# Patient Record
Sex: FEMALE | Race: BLACK OR AFRICAN AMERICAN | NOT HISPANIC OR LATINO | Employment: FULL TIME | URBAN - METROPOLITAN AREA
[De-identification: names, ages, dates, MRNs, and addresses within clinical notes are randomized per-mention and may not be internally consistent; named-entity substitution may affect disease eponyms.]

---

## 2023-04-27 LAB — GROUP B STREP SCREEN: ABNORMAL

## 2023-09-26 ENCOUNTER — OFFICE VISIT (OUTPATIENT)
Dept: PRIMARY CARE | Facility: CLINIC | Age: 33
End: 2023-09-26
Payer: COMMERCIAL

## 2023-09-26 VITALS
RESPIRATION RATE: 16 BRPM | WEIGHT: 146.3 LBS | BODY MASS INDEX: 25.92 KG/M2 | HEIGHT: 63 IN | HEART RATE: 82 BPM | SYSTOLIC BLOOD PRESSURE: 132 MMHG | OXYGEN SATURATION: 92 % | DIASTOLIC BLOOD PRESSURE: 89 MMHG

## 2023-09-26 DIAGNOSIS — R53.83 OTHER FATIGUE: Primary | ICD-10-CM

## 2023-09-26 PROCEDURE — 99213 OFFICE O/P EST LOW 20 MIN: CPT | Performed by: STUDENT IN AN ORGANIZED HEALTH CARE EDUCATION/TRAINING PROGRAM

## 2023-09-26 ASSESSMENT — ENCOUNTER SYMPTOMS
SPEECH DIFFICULTY: 0
CONSTIPATION: 0
ABDOMINAL PAIN: 0
NAUSEA: 0
ACTIVITY CHANGE: 0
FEVER: 0
HEADACHES: 1
SHORTNESS OF BREATH: 0
HEMATURIA: 0
DYSURIA: 0
COUGH: 0
WHEEZING: 0
WEAKNESS: 0
NUMBNESS: 0
VOMITING: 0
DIZZINESS: 0

## 2023-09-26 NOTE — PROGRESS NOTES
"Subjective   Patient ID: Jessica Howard is a 33 y.o. female who presents for Headache.  Headache   Pertinent negatives include no abdominal pain, coughing, dizziness, fever, nausea, numbness, vomiting or weakness.     Harini reprots headache since a week progressively getting better. Some congestions nad myalgias along with fatigue. Has two children both in day care having congestion as well.   NO fever./ changes in vision reported  Recent ER visit for headache: sent home with conservative management    Past Medical History:   Diagnosis Date    Other specified health status     No pertinent past medical history      Past Surgical History:   Procedure Laterality Date    OTHER SURGICAL HISTORY  09/21/2020    No history of surgery      No family history on file.   Not on File       Occupation:     Review of Systems   Constitutional:  Negative for activity change and fever.   HENT:  Negative for congestion.    Respiratory:  Negative for cough, shortness of breath and wheezing.    Cardiovascular:  Negative for chest pain and leg swelling.   Gastrointestinal:  Negative for abdominal pain, constipation, nausea and vomiting.   Endocrine: Negative for cold intolerance.   Genitourinary:  Negative for dysuria, hematuria and urgency.   Neurological:  Positive for headaches. Negative for dizziness, speech difficulty, weakness and numbness.   Psychiatric/Behavioral:  Negative for self-injury and suicidal ideas.        Objective   Visit Vitals  /89   Pulse 82   Resp 16   Ht 1.6 m (5' 3\")   Wt 66.4 kg (146 lb 4.8 oz)   SpO2 92%   BMI 25.92 kg/m²   BSA 1.72 m²      Physical Exam  Constitutional:       Appearance: Normal appearance.   HENT:      Head: Normocephalic and atraumatic.      Nose: Nose normal.      Mouth/Throat:      Mouth: Mucous membranes are moist.   Eyes:      Conjunctiva/sclera: Conjunctivae normal.      Pupils: Pupils are equal, round, and reactive to light.   Cardiovascular:      Rate and Rhythm: Normal rate and " regular rhythm.      Pulses: Normal pulses.      Heart sounds: Normal heart sounds.   Pulmonary:      Effort: Pulmonary effort is normal.      Breath sounds: Normal breath sounds.   Musculoskeletal:         General: Normal range of motion.      Cervical back: Neck supple.   Skin:     General: Skin is warm.   Neurological:      General: No focal deficit present.      Mental Status: She is alert and oriented to person, place, and time. Mental status is at baseline.      Cranial Nerves: No cranial nerve deficit.      Sensory: No sensory deficit.      Motor: No weakness.      Coordination: Coordination normal.      Gait: Gait normal.   Psychiatric:         Mood and Affect: Mood normal.         Behavior: Behavior normal.         Thought Content: Thought content normal.         Judgment: Judgment normal.         Assessment/Plan     Problem List Items Addressed This Visit    None  Visit Diagnoses       Other fatigue    -  Primary         Possibly post viral fatigue given the congestion and contact with  children/ Improving progressively. Will adv patient to hydrate well, blood work in ER neg for anemia; adv patient to monitor clinical and call with worsening of symptoms   Headache on and off ; at diff site, mild to moderate in intensity, non radiating, No changes in vision/ nause/ vomiting/ FND reproted; therefore no indication for imaging at this time   Patient to seek medical attention immediately / call 911  if has worsening of symptoms  or develops alarm symptoms as discussed. Verbalizes understanding

## 2023-10-13 PROBLEM — R51.9 HEADACHE: Status: ACTIVE | Noted: 2023-10-13

## 2023-10-13 PROBLEM — E55.9 VITAMIN D DEFICIENCY: Status: ACTIVE | Noted: 2023-10-13

## 2023-10-13 PROBLEM — N92.6 MISSED PERIOD: Status: ACTIVE | Noted: 2023-10-13

## 2023-10-13 PROBLEM — E66.3 OVERWEIGHT (BMI 25.0-29.9): Status: ACTIVE | Noted: 2023-10-13

## 2023-10-13 PROBLEM — N89.8 VAGINAL DISCHARGE: Status: ACTIVE | Noted: 2023-10-13

## 2023-10-13 PROBLEM — B00.9 HSV INFECTION: Status: ACTIVE | Noted: 2023-10-13

## 2023-10-13 PROBLEM — R10.2 PELVIC PAIN: Status: ACTIVE | Noted: 2023-10-13

## 2023-10-13 PROBLEM — T78.40XA ALLERGIC REACTION: Status: ACTIVE | Noted: 2023-10-13

## 2023-10-13 PROBLEM — N92.6 IRREGULAR MENSTRUAL CYCLE: Status: ACTIVE | Noted: 2023-10-13

## 2023-10-13 PROBLEM — E87.6 HYPOKALEMIA: Status: ACTIVE | Noted: 2023-10-13

## 2023-10-13 PROBLEM — R10.84 GENERALIZED ABDOMINAL PAIN: Status: ACTIVE | Noted: 2023-10-13

## 2023-10-13 PROBLEM — R09.82 POST-NASAL DRIP: Status: ACTIVE | Noted: 2023-10-13

## 2023-10-13 PROBLEM — K59.09 CONSTIPATION, CHRONIC: Status: ACTIVE | Noted: 2023-10-13

## 2023-10-13 PROBLEM — G43.909 MIGRAINE HEADACHE: Status: ACTIVE | Noted: 2023-10-13

## 2023-10-13 PROBLEM — N91.1 SECONDARY AMENORRHEA: Status: ACTIVE | Noted: 2023-10-13

## 2023-10-13 RX ORDER — PNV NO.95/FERROUS FUM/FOLIC AC 28MG-0.8MG
TABLET ORAL
COMMUNITY
Start: 2023-01-16 | End: 2023-10-16

## 2023-10-13 RX ORDER — IBUPROFEN 800 MG/1
800 TABLET ORAL EVERY 8 HOURS PRN
COMMUNITY
Start: 2021-05-25 | End: 2023-10-16

## 2023-10-13 RX ORDER — ACETAMINOPHEN 500 MG
1000 TABLET ORAL 3 TIMES DAILY
COMMUNITY
Start: 2021-05-25 | End: 2023-10-16

## 2023-10-13 RX ORDER — CYCLOBENZAPRINE HCL 5 MG
5 TABLET ORAL 3 TIMES DAILY
COMMUNITY
Start: 2021-10-12 | End: 2023-10-16

## 2023-10-13 RX ORDER — DOCUSATE SODIUM 100 MG/1
100 CAPSULE, LIQUID FILLED ORAL 2 TIMES DAILY PRN
COMMUNITY
Start: 2021-05-25 | End: 2023-10-16

## 2023-10-13 RX ORDER — ONDANSETRON 4 MG/1
4 TABLET, ORALLY DISINTEGRATING ORAL EVERY 6 HOURS PRN
COMMUNITY
Start: 2021-11-09 | End: 2023-10-16

## 2023-10-13 RX ORDER — FAMOTIDINE 10 MG/1
1 TABLET ORAL 2 TIMES DAILY
COMMUNITY
Start: 2022-03-22 | End: 2023-10-16

## 2023-10-13 RX ORDER — ERGOCALCIFEROL (VITAMIN D2) 10 MCG
2 TABLET ORAL DAILY
COMMUNITY
Start: 2022-01-06 | End: 2023-10-16

## 2023-10-13 RX ORDER — ACETAMINOPHEN 325 MG/1
3 TABLET ORAL EVERY 6 HOURS PRN
COMMUNITY
Start: 2023-05-12 | End: 2023-10-16

## 2023-10-13 RX ORDER — IBUPROFEN 600 MG/1
600 TABLET ORAL EVERY 6 HOURS PRN
COMMUNITY
Start: 2023-05-12 | End: 2023-10-16

## 2023-10-13 RX ORDER — FLUTICASONE PROPIONATE 50 MCG
1 SPRAY, SUSPENSION (ML) NASAL 2 TIMES DAILY PRN
COMMUNITY
Start: 2022-07-14 | End: 2023-10-16

## 2023-10-16 ENCOUNTER — PROCEDURE VISIT (OUTPATIENT)
Dept: OBSTETRICS AND GYNECOLOGY | Facility: CLINIC | Age: 33
End: 2023-10-16
Payer: COMMERCIAL

## 2023-10-16 VITALS
SYSTOLIC BLOOD PRESSURE: 100 MMHG | WEIGHT: 148 LBS | HEIGHT: 64 IN | BODY MASS INDEX: 25.27 KG/M2 | DIASTOLIC BLOOD PRESSURE: 72 MMHG

## 2023-10-16 DIAGNOSIS — N92.1 BREAKTHROUGH BLEEDING ON NEXPLANON: Primary | ICD-10-CM

## 2023-10-16 DIAGNOSIS — Z97.5 BREAKTHROUGH BLEEDING ON NEXPLANON: Primary | ICD-10-CM

## 2023-10-16 PROCEDURE — 99213 OFFICE O/P EST LOW 20 MIN: CPT | Performed by: OBSTETRICS & GYNECOLOGY

## 2023-10-16 RX ORDER — ETONOGESTREL 68 MG/1
1 IMPLANT SUBCUTANEOUS ONCE
COMMUNITY

## 2023-10-16 ASSESSMENT — PAIN SCALES - GENERAL: PAINLEVEL: 0-NO PAIN

## 2023-10-16 ASSESSMENT — ENCOUNTER SYMPTOMS
CHILLS: 0
FLANK PAIN: 0
ABDOMINAL DISTENTION: 0
VOMITING: 0
ABDOMINAL PAIN: 0
HEADACHES: 1
SHORTNESS OF BREATH: 0
BLOOD IN STOOL: 0
APPETITE CHANGE: 0
BACK PAIN: 0
NAUSEA: 1
DYSURIA: 0
HEMATURIA: 0
FEVER: 0
COLOR CHANGE: 0
UNEXPECTED WEIGHT CHANGE: 0
CONSTIPATION: 0
DIARRHEA: 0
FATIGUE: 0
FREQUENCY: 0
SLEEP DISTURBANCE: 0

## 2023-10-16 NOTE — PROGRESS NOTES
"Jessica Howard is a 33 y.o. . Here to discuss nexplanon.   Pt had nexplanon placed while in the hospital after the birth of her last baby.   Baby born 23    Pt reports almost 5 months of vaginal bleeding with nexplanon.   Pt became concerned when she had 1 week of headaches and nasuea. She went to the ED and saw her PCP.  Headaches spontaneously resolved. Pt worried headaches d/t nexplanon.   Pt reports bleeding stopped 1 week ago.    Likes ease of nexplanon.       OB History    No obstetric history on file.       Past med hx and past surg hx reviewed and notable for: none    Objective   /72   Ht 1.626 m (5' 4\")   Wt 67.1 kg (148 lb)   BMI 25.40 kg/m²     Review of Systems   Constitutional:  Negative for appetite change, chills, fatigue, fever and unexpected weight change.   Respiratory:  Negative for shortness of breath.    Cardiovascular:  Negative for chest pain.   Gastrointestinal:  Positive for nausea. Negative for abdominal distention, abdominal pain, blood in stool, constipation, diarrhea and vomiting.   Endocrine: Negative for cold intolerance and heat intolerance.   Genitourinary:  Positive for vaginal bleeding. Negative for dyspareunia, dysuria, flank pain, frequency, genital sores, hematuria, menstrual problem, pelvic pain, urgency, vaginal discharge and vaginal pain.   Musculoskeletal:  Negative for back pain.   Skin:  Negative for color change.   Neurological:  Positive for headaches.   Psychiatric/Behavioral:  Negative for sleep disturbance.        Physical Exam  Constitutional:       Appearance: Normal appearance. She is normal weight.   Musculoskeletal:         General: Normal range of motion.      Comments: Nexplanon easily palpated in LUE ; proper position confirmed   Neurological:      General: No focal deficit present.      Mental Status: She is alert and oriented to person, place, and time.   Psychiatric:         Mood and Affect: Mood normal.         Behavior: Behavior normal.  "        Thought Content: Thought content normal.         Judgment: Judgment normal.          Assessment and Plan:    Contraception / BTB on nexplanon  Pt would like to keep nexplanon in place ; wanted reassurance regarding position.    Position normal on exam today.   We discussed that headaches and nausea 4 months after the nexplanon was place were likely not related.   Headaches and nausea have now resolved.   Irregular bleeding has resolved.     We discussed if irregular bleeding resumes I can prescribe short course of oral estrogen to help. Pt agreeable and will call/message if needed.     No orders of the defined types were placed in this encounter.

## 2024-02-29 ENCOUNTER — HOSPITAL ENCOUNTER (EMERGENCY)
Facility: HOSPITAL | Age: 34
Discharge: HOME | End: 2024-02-29
Attending: EMERGENCY MEDICINE
Payer: COMMERCIAL

## 2024-02-29 VITALS
BODY MASS INDEX: 23.9 KG/M2 | DIASTOLIC BLOOD PRESSURE: 70 MMHG | SYSTOLIC BLOOD PRESSURE: 120 MMHG | WEIGHT: 140 LBS | HEIGHT: 64 IN | OXYGEN SATURATION: 100 % | HEART RATE: 92 BPM | TEMPERATURE: 97.9 F | RESPIRATION RATE: 16 BRPM

## 2024-02-29 DIAGNOSIS — N93.9 VAGINAL BLEEDING: Primary | ICD-10-CM

## 2024-02-29 LAB
ALBUMIN SERPL BCP-MCNC: 5 G/DL (ref 3.4–5)
ALP SERPL-CCNC: 77 U/L (ref 33–110)
ALT SERPL W P-5'-P-CCNC: 14 U/L (ref 7–45)
ANION GAP SERPL CALC-SCNC: 15 MMOL/L (ref 10–20)
APPEARANCE UR: CLEAR
AST SERPL W P-5'-P-CCNC: 18 U/L (ref 9–39)
B-HCG SERPL-ACNC: <2 MIU/ML
BASOPHILS # BLD AUTO: 0.02 X10*3/UL (ref 0–0.1)
BASOPHILS NFR BLD AUTO: 0.3 %
BILIRUB DIRECT SERPL-MCNC: 0.3 MG/DL (ref 0–0.3)
BILIRUB SERPL-MCNC: 2 MG/DL (ref 0–1.2)
BILIRUB UR STRIP.AUTO-MCNC: NEGATIVE MG/DL
BUN SERPL-MCNC: 12 MG/DL (ref 6–23)
CALCIUM SERPL-MCNC: 9.5 MG/DL (ref 8.6–10.3)
CHLORIDE SERPL-SCNC: 101 MMOL/L (ref 98–107)
CO2 SERPL-SCNC: 25 MMOL/L (ref 21–32)
COLOR UR: YELLOW
CREAT SERPL-MCNC: 0.79 MG/DL (ref 0.5–1.05)
EGFRCR SERPLBLD CKD-EPI 2021: >90 ML/MIN/1.73M*2
EOSINOPHIL # BLD AUTO: 0.04 X10*3/UL (ref 0–0.7)
EOSINOPHIL NFR BLD AUTO: 0.6 %
ERYTHROCYTE [DISTWIDTH] IN BLOOD BY AUTOMATED COUNT: 13.6 % (ref 11.5–14.5)
GLUCOSE SERPL-MCNC: 90 MG/DL (ref 74–99)
GLUCOSE UR STRIP.AUTO-MCNC: NEGATIVE MG/DL
HCG UR QL IA.RAPID: NEGATIVE
HCT VFR BLD AUTO: 41 % (ref 36–46)
HGB BLD-MCNC: 13.4 G/DL (ref 12–16)
IMM GRANULOCYTES # BLD AUTO: 0.02 X10*3/UL (ref 0–0.7)
IMM GRANULOCYTES NFR BLD AUTO: 0.3 % (ref 0–0.9)
KETONES UR STRIP.AUTO-MCNC: NEGATIVE MG/DL
LEUKOCYTE ESTERASE UR QL STRIP.AUTO: NEGATIVE
LYMPHOCYTES # BLD AUTO: 3.3 X10*3/UL (ref 1.2–4.8)
LYMPHOCYTES NFR BLD AUTO: 45.7 %
MCH RBC QN AUTO: 29.1 PG (ref 26–34)
MCHC RBC AUTO-ENTMCNC: 32.7 G/DL (ref 32–36)
MCV RBC AUTO: 89 FL (ref 80–100)
MONOCYTES # BLD AUTO: 0.52 X10*3/UL (ref 0.1–1)
MONOCYTES NFR BLD AUTO: 7.2 %
NEUTROPHILS # BLD AUTO: 3.32 X10*3/UL (ref 1.2–7.7)
NEUTROPHILS NFR BLD AUTO: 45.9 %
NITRITE UR QL STRIP.AUTO: NEGATIVE
NRBC BLD-RTO: 0 /100 WBCS (ref 0–0)
PH UR STRIP.AUTO: 6 [PH]
PLATELET # BLD AUTO: 276 X10*3/UL (ref 150–450)
POTASSIUM SERPL-SCNC: 3.7 MMOL/L (ref 3.5–5.3)
PROT SERPL-MCNC: 8.2 G/DL (ref 6.4–8.2)
PROT UR STRIP.AUTO-MCNC: NEGATIVE MG/DL
RBC # BLD AUTO: 4.6 X10*6/UL (ref 4–5.2)
RBC # UR STRIP.AUTO: NEGATIVE /UL
SODIUM SERPL-SCNC: 137 MMOL/L (ref 136–145)
SP GR UR STRIP.AUTO: 1.01
UROBILINOGEN UR STRIP.AUTO-MCNC: <2 MG/DL
WBC # BLD AUTO: 7.2 X10*3/UL (ref 4.4–11.3)

## 2024-02-29 PROCEDURE — 36415 COLL VENOUS BLD VENIPUNCTURE: CPT | Performed by: EMERGENCY MEDICINE

## 2024-02-29 PROCEDURE — 81025 URINE PREGNANCY TEST: CPT | Performed by: EMERGENCY MEDICINE

## 2024-02-29 PROCEDURE — 82248 BILIRUBIN DIRECT: CPT | Performed by: EMERGENCY MEDICINE

## 2024-02-29 PROCEDURE — 84702 CHORIONIC GONADOTROPIN TEST: CPT | Performed by: EMERGENCY MEDICINE

## 2024-02-29 PROCEDURE — 80048 BASIC METABOLIC PNL TOTAL CA: CPT | Performed by: EMERGENCY MEDICINE

## 2024-02-29 PROCEDURE — 99283 EMERGENCY DEPT VISIT LOW MDM: CPT

## 2024-02-29 PROCEDURE — 85025 COMPLETE CBC W/AUTO DIFF WBC: CPT | Performed by: EMERGENCY MEDICINE

## 2024-02-29 PROCEDURE — 81003 URINALYSIS AUTO W/O SCOPE: CPT | Performed by: EMERGENCY MEDICINE

## 2024-02-29 ASSESSMENT — COLUMBIA-SUICIDE SEVERITY RATING SCALE - C-SSRS
2. HAVE YOU ACTUALLY HAD ANY THOUGHTS OF KILLING YOURSELF?: NO
6. HAVE YOU EVER DONE ANYTHING, STARTED TO DO ANYTHING, OR PREPARED TO DO ANYTHING TO END YOUR LIFE?: NO
1. IN THE PAST MONTH, HAVE YOU WISHED YOU WERE DEAD OR WISHED YOU COULD GO TO SLEEP AND NOT WAKE UP?: NO

## 2024-02-29 ASSESSMENT — PAIN SCALES - GENERAL: PAINLEVEL_OUTOF10: 8

## 2024-02-29 ASSESSMENT — PAIN - FUNCTIONAL ASSESSMENT: PAIN_FUNCTIONAL_ASSESSMENT: 0-10

## 2024-02-29 ASSESSMENT — PAIN DESCRIPTION - LOCATION: LOCATION: PELVIS

## 2024-02-29 NOTE — ED TRIAGE NOTES
"PT STATES \"I THINK I AM HAVING AN ECTOPIC PREGNANCY, I HAVE BIRTH CONTROL IN MY ARM BUT MY DAUGHTER JUMPED ON MY STOMACH LAST WEEK AND IT FELT LIKE DEATH\" DENIES DYSURIA, PT STATES \"I HAVE BEEN BLEEDING FOR A MONTH OFF AND ON EVER SINCE I GOT THIS BIRTH CONTROL, I DELIVERED MY SON 9 MONTHS AGO AND I HAVE NOT BEEN RIGHT SINCE\"   "

## 2024-03-03 NOTE — ED PROVIDER NOTES
HPI   Chief Complaint   Patient presents with    Abdominal Pain       HPI: []  33-year-old  female who has no medical history comes in with concerns for ectopic pregnancy.  She states that she got both of her implant about a month ago and since then she has had intermittent spotting.  And some abdominal cramping.  She was concerned she may be pregnant and has an ectopic pregnancy.  Bleeding is minimal and only spotting here and there.  Pain is minimal cramp-like in nature.  She has no associated nausea vomit diarrhea fever chills cough congestion incontinence seizures syncope or near syncope.  Denies hematemesis melena medic easier no hemoptysis.  She has irregular menstrual period.    Past history: None  Social: Patient denies current tobacco alcohol drug abuse.  REVIEW OF SYSTEMS:    GENERAL.: No weight loss, fatigue, anorexia, insomnia, fever.    EYES: No vision loss, double vision, drainage, eye pain.    ENT: No pharyngitis, dry mouth.    CARDIOPULMONARY: No chest pain, palpitations, syncope, near syncope. No shortness of breath, cough, hemoptysis.    GI: No abdominal pain, change in bowel habits, melena, hematemesis, hematochezia, nausea, vomiting, diarrhea.    : No discharge, dysuria, frequency, urgency, hematuria.  Positive vaginal spotting    MS: No limb pain, joint pain, joint swelling.    SKIN: No rashes.    PSYCH: No depression, anxiety, suicidality, homicidality.    Review of systems is otherwise negative unless stated above or in history of present illness.  Social history, family history, allergies reviewed.  PHYSICAL EXAM:    GENERAL: Vitals noted, no distress. Alert and oriented  x 3. Non-toxic.  Appears anxious    EENT: TMs clear. Posterior oropharynx unremarkable. No meningismus. No LAD.     NECK: Supple. Nontender. No midline tenderness.     CARDIAC: Regular, rate, rhythm. No murmurs rubs or gallops. No JVD    PULMONARY: Lungs clear bilaterally with good aeration. No wheezes rales or  rhonchi. No respiratory distress.     ABDOMEN: Soft, nonsurgical. Nontender. No peritoneal signs. Normoactive bowel sounds. No pulsatile masses.  Negative CVA tenderness    EXTREMITIES: No peripheral edema. Negative Homans bilaterally, no cords.  2+ bounding pulses well-perfused    SKIN: No rash. Intact.     NEURO: No focal neurologic deficits, NIH score of 0. Cranial nerves normal as tested from II through XII.     MEDICAL DECISION MAKING:  Pregnancy negative, CBC with differential is unremarkable no leukocytosis stable hemoglobin chemistries are normal serum hCG negative, UA negative.    Treatment in ED: IV established placed on a cardiac monitor.    ED course: Patient remained stable hemodynamic.  With no active vaginal bleeding.  She was relieved when she found she is not pregnant.    Impression: #1 dysfunctional uterine bleeding    Plan/MDM: Young female 33 years old comes in with vaginal spotting for months duration after he had a birth control implant in her arm she was concern for ectopic pregnancy her pregnancy is negative.  The urine and serum hCG, her labs are reassuring no leukocytosis stable hemoglobin UA negative low concern for ectopic pregnancy or hemorrhagic shock, patient reassured be discharged home supportive care recommended close outpatient follow-up primary doctor and gynecologist with strict return precaution.                          No data recorded                   Patient History   Past Medical History:   Diagnosis Date    Other specified health status     No pertinent past medical history     Past Surgical History:   Procedure Laterality Date    OTHER SURGICAL HISTORY  09/21/2020    No history of surgery     No family history on file.  Social History     Tobacco Use    Smoking status: Not on file    Smokeless tobacco: Not on file   Substance Use Topics    Alcohol use: Not on file    Drug use: Not on file       Physical Exam   ED Triage Vitals   Temperature Heart Rate Respirations BP    02/29/24 1138 02/29/24 1138 02/29/24 1138 02/29/24 1138   36.6 °C (97.8 °F) (!) 109 18 (!) 155/93      Pulse Ox Temp Source Heart Rate Source Patient Position   02/29/24 1138 02/29/24 1410 02/29/24 1410 --   100 % Oral Monitor       BP Location FiO2 (%)     -- --             Physical Exam    ED Course & MDM   ED Course as of 03/03/24 1614   Thu Feb 29, 2024   1402 Patient UA negative for UTI pregnancy negative in the urine and blood CBC blood chemistries unremarkable patient reassured, she was concerned for possible pregnancy advised and outpatient follow-up with primary doctor and gynecologist regarding her vaginal bleeding with close outpatient follow-up and strict return precaution. [MT]      ED Course User Index  [MT] Osvaldo Lopez MD         Diagnoses as of 03/03/24 1614   Vaginal bleeding       Medical Decision Making      Procedure  Procedures     Osvaldo Lopez MD  03/03/24 1622

## 2024-03-11 ENCOUNTER — APPOINTMENT (OUTPATIENT)
Dept: PRIMARY CARE | Facility: CLINIC | Age: 34
End: 2024-03-11
Payer: COMMERCIAL

## 2025-03-21 ENCOUNTER — OFFICE VISIT (OUTPATIENT)
Dept: URGENT CARE | Age: 35
End: 2025-03-21
Payer: COMMERCIAL

## 2025-03-21 VITALS
HEART RATE: 93 BPM | TEMPERATURE: 97.9 F | RESPIRATION RATE: 17 BRPM | OXYGEN SATURATION: 99 % | SYSTOLIC BLOOD PRESSURE: 143 MMHG | DIASTOLIC BLOOD PRESSURE: 89 MMHG

## 2025-03-21 DIAGNOSIS — R03.0 ELEVATED BLOOD PRESSURE READING: ICD-10-CM

## 2025-03-21 DIAGNOSIS — R42 DIZZINESS: Primary | ICD-10-CM

## 2025-03-21 LAB
POC APPEARANCE, URINE: CLEAR
POC BILIRUBIN, URINE: NEGATIVE
POC BLOOD, URINE: ABNORMAL
POC COLOR, URINE: YELLOW
POC FINGERSTICK BLOOD GLUCOSE: 94 MG/DL (ref 70–100)
POC GLUCOSE, URINE: NEGATIVE MG/DL
POC KETONES, URINE: ABNORMAL MG/DL
POC LEUKOCYTES, URINE: NEGATIVE
POC NITRITE,URINE: NEGATIVE
POC PH, URINE: 6 PH
POC PROTEIN, URINE: NEGATIVE MG/DL
POC SPECIFIC GRAVITY, URINE: <=1.005
POC UROBILINOGEN, URINE: 0.2 EU/DL
PREGNANCY TEST URINE, POC: NEGATIVE

## 2025-03-21 RX ORDER — MECLIZINE HCL 12.5 MG 12.5 MG/1
12.5 TABLET ORAL 3 TIMES DAILY PRN
Qty: 15 TABLET | Refills: 0 | Status: SHIPPED | OUTPATIENT
Start: 2025-03-21 | End: 2025-03-26

## 2025-03-21 RX ORDER — MECLIZINE HCL 12.5 MG 12.5 MG/1
12.5 TABLET ORAL ONCE
Status: COMPLETED | OUTPATIENT
Start: 2025-03-21 | End: 2025-03-21

## 2025-03-21 RX ORDER — ONDANSETRON 4 MG/1
4 TABLET, FILM COATED ORAL EVERY 8 HOURS PRN
Qty: 10 TABLET | Refills: 0 | Status: SHIPPED | OUTPATIENT
Start: 2025-03-21 | End: 2025-03-28

## 2025-03-21 RX ADMIN — MECLIZINE HCL 12.5 MG 12.5 MG: 12.5 TABLET ORAL at 15:20

## 2025-03-21 ASSESSMENT — PATIENT HEALTH QUESTIONNAIRE - PHQ9
2. FEELING DOWN, DEPRESSED OR HOPELESS: NOT AT ALL
1. LITTLE INTEREST OR PLEASURE IN DOING THINGS: NOT AT ALL
SUM OF ALL RESPONSES TO PHQ9 QUESTIONS 1 AND 2: 0

## 2025-03-21 NOTE — PROGRESS NOTES
HPI:  Patient states that she started having dizziness earlier today.  Pt states that she has a sensation of sea sickness and spinning.  Pt has a cold about 2 weeks ago.  Pt noticed some pressure in her left ear.  Pt denies fever/chills.  No cp or sob.  No numbness/weakness in ue/le.  No HA. No blurry vision.  Pt had some nausea.  Pt is currently spotting.  No hx/o vertigo in the past.      ROS:  No fever/chills  No n/v  No cp  No sob  No ha  No blurry vision  No numbness/weakness    PE:    A&O x3  NCAT  PERRLA, EOMI  TM clear bl  No pharyngeal erythema  Sinus tachy, no jvd, no carotid brui  CTAB  CN 2-12 intact as tested  MOEx4  No focal deficit  Judgement normal  No submandibular nodes    Results:  UA:+blood  Urine pregnancy:negative  EKG:sinus rhythm, no acute changes  Blood sugar: 94    After Meclizine pt feels a little better    A/P:   Dizziness  Elevated blood pressure reading    Your symptoms are likely secondary to vertigo.  Increase fluids.  Rest.  Use meclizine as needed.  If no improvement in 5 days follow up with your doctor for further evaluation.  Low salt diet.  Monitor blood pressure at home and recheck with your doctor if remains elevated. Go to the ER if symptoms start getting worse.

## 2025-05-30 ENCOUNTER — APPOINTMENT (OUTPATIENT)
Dept: PRIMARY CARE | Facility: CLINIC | Age: 35
End: 2025-05-30
Payer: COMMERCIAL

## 2025-05-30 VITALS
SYSTOLIC BLOOD PRESSURE: 100 MMHG | DIASTOLIC BLOOD PRESSURE: 66 MMHG | OXYGEN SATURATION: 98 % | HEART RATE: 76 BPM | WEIGHT: 148 LBS | HEIGHT: 64 IN | BODY MASS INDEX: 25.27 KG/M2

## 2025-05-30 DIAGNOSIS — D64.9 ANEMIA, UNSPECIFIED TYPE: Primary | ICD-10-CM

## 2025-05-30 DIAGNOSIS — Z11.3 SCREEN FOR STD (SEXUALLY TRANSMITTED DISEASE): ICD-10-CM

## 2025-05-30 DIAGNOSIS — Z00.00 HEALTH CARE MAINTENANCE: ICD-10-CM

## 2025-05-30 PROCEDURE — 99395 PREV VISIT EST AGE 18-39: CPT | Performed by: STUDENT IN AN ORGANIZED HEALTH CARE EDUCATION/TRAINING PROGRAM

## 2025-05-30 PROCEDURE — 1036F TOBACCO NON-USER: CPT | Performed by: STUDENT IN AN ORGANIZED HEALTH CARE EDUCATION/TRAINING PROGRAM

## 2025-05-30 PROCEDURE — 3008F BODY MASS INDEX DOCD: CPT | Performed by: STUDENT IN AN ORGANIZED HEALTH CARE EDUCATION/TRAINING PROGRAM

## 2025-05-30 ASSESSMENT — ENCOUNTER SYMPTOMS
NAUSEA: 0
DIZZINESS: 0
ACTIVITY CHANGE: 0
CONSTIPATION: 0
SHORTNESS OF BREATH: 0
SPEECH DIFFICULTY: 0
WHEEZING: 0
NUMBNESS: 0
DYSURIA: 0
COUGH: 0
ABDOMINAL PAIN: 0
FEVER: 0
VOMITING: 0
WEAKNESS: 0
HEMATURIA: 0

## 2025-05-30 NOTE — PROGRESS NOTES
Subjective   Patient ID: Jessica Howrad is a 35 y.o. female who presents for Annual Exam.  HPI  Patient is here for annual physical         # reports diarrhoea since morning  No abd pain   No fever/ blood in stools or mucus  No travel history  No Respiratory/ congestion     []adv to hydrate and continue to monitor   Call back in 3-4 days if not feeling better   To seek medical attention with worsening of symptoms / development of new symptoms as mentioned above    #  vit D def   # HCM  PAP- 2022 next due 2025; adv to call GYN  Has a nexplanon       Vaccines - declines         Medical History[1]   Surgical History[2]   Family History[3]   Allergies[4]       Occupation:     Review of Systems   Constitutional:  Negative for activity change and fever.   HENT:  Negative for congestion.    Respiratory:  Negative for cough, shortness of breath and wheezing.    Cardiovascular:  Negative for chest pain and leg swelling.   Gastrointestinal:  Negative for abdominal pain, constipation, nausea and vomiting.   Endocrine: Negative for cold intolerance.   Genitourinary:  Negative for dysuria, hematuria and urgency.   Neurological:  Negative for dizziness, speech difficulty, weakness and numbness.   Psychiatric/Behavioral:  Negative for self-injury and suicidal ideas.        Objective   Visit Vitals  Smoking Status Never      Physical Exam  HENT:      Head: Normocephalic and atraumatic.      Right Ear: Tympanic membrane normal.      Left Ear: Tympanic membrane normal.      Nose: Nose normal.      Mouth/Throat:      Mouth: Mucous membranes are moist.   Eyes:      Extraocular Movements: Extraocular movements intact.      Conjunctiva/sclera: Conjunctivae normal.      Pupils: Pupils are equal, round, and reactive to light.   Cardiovascular:      Rate and Rhythm: Normal rate and regular rhythm.      Pulses: Normal pulses.      Heart sounds: Normal heart sounds.   Pulmonary:      Effort: Pulmonary effort is normal.      Breath sounds:  Normal breath sounds. No stridor. No rhonchi.   Abdominal:      General: Bowel sounds are normal.      Palpations: Abdomen is soft.      Tenderness: There is no abdominal tenderness. There is no guarding or rebound.   Musculoskeletal:      Cervical back: Neck supple.   Neurological:      Mental Status: She is oriented to person, place, and time.   Psychiatric:         Mood and Affect: Mood normal.         Behavior: Behavior normal.         Assessment/Plan   Diagnoses and all orders for this visit:  Anemia, unspecified type  -     Ferritin; Future  -     Iron and TIBC; Future  Health care maintenance  -     Hemoglobin A1C; Future  -     CBC  -     Lipid Panel; Future  -     Comprehensive Metabolic Panel; Future  -     TSH with reflex to Free T4 if abnormal; Future  Screen for STD (sexually transmitted disease)  -     Hepatitis C Antibody; Future  -     Hepatitis B Surface Antigen; Future  -     Hepatitis B Surface Antibody; Future  -     HIV 1/2 Antigen/Antibody Screen with Reflex to Confirmation; Future  -     Trichomonas vaginalis, Amplified; Future  -     C. trachomatis / N. gonorrhoeae, Amplified, Urogenital; Future              [1]   Past Medical History:  Diagnosis Date    Other specified health status     No pertinent past medical history   [2]   Past Surgical History:  Procedure Laterality Date    OTHER SURGICAL HISTORY  09/21/2020    No history of surgery   [3] No family history on file.  [4] No Known Allergies

## 2025-05-30 NOTE — PATIENT INSTRUCTIONS
It was nice seeing you today   Here is a a summary of what we discussed -  Your Blood pressure is at goal today. . Please take a low salt diet and exercise atleast for 150min/week  We have ordered some labs for you. Please proceed to the our lab at [address] to give these labs. Please ensure these labs are given in a fasting state.   3. Please call GYN for PAP   4. Vaccinations recommended for you are    Annual flu vaccine  Covid vaccine as per CDC guidelines  HPV vaccine  Hep B vaccine  Tetanus vaccine (once every 10 years)      Please call the scheduling line below to set up the appointment    Once all the results are obtained we will call you with abnormal results if any and discuss necessity medication/lifestyle changes/further evaluation.  Please feel free to call our office at [phone] with any questions  Please do not hesitate to call us for medication refills.  In case of emergency please proceed to the nearest emergency room or call 911.    Please follow-up with me in 3 months.  You can set your appointment by stopping by at the  or calling our office at [phone] or logging onto [employer] account.

## 2025-06-03 LAB
ALBUMIN SERPL-MCNC: 4.7 G/DL (ref 3.6–5.1)
ALP SERPL-CCNC: 71 U/L (ref 31–125)
ALT SERPL-CCNC: 12 U/L (ref 6–29)
ANION GAP SERPL CALCULATED.4IONS-SCNC: 7 MMOL/L (CALC) (ref 7–17)
AST SERPL-CCNC: 13 U/L (ref 10–30)
BILIRUB SERPL-MCNC: 1.2 MG/DL (ref 0.2–1.2)
BUN SERPL-MCNC: 16 MG/DL (ref 7–25)
C TRACH RRNA SPEC QL NAA+PROBE: NOT DETECTED
CALCIUM SERPL-MCNC: 9.6 MG/DL (ref 8.6–10.2)
CHLORIDE SERPL-SCNC: 105 MMOL/L (ref 98–110)
CHOLEST SERPL-MCNC: 204 MG/DL
CHOLEST/HDLC SERPL: 3.5 (CALC)
CO2 SERPL-SCNC: 27 MMOL/L (ref 20–32)
CREAT SERPL-MCNC: 0.73 MG/DL (ref 0.5–0.97)
EGFRCR SERPLBLD CKD-EPI 2021: 111 ML/MIN/1.73M2
ERYTHROCYTE [DISTWIDTH] IN BLOOD BY AUTOMATED COUNT: 12.7 % (ref 11–15)
EST. AVERAGE GLUCOSE BLD GHB EST-MCNC: 105 MG/DL
EST. AVERAGE GLUCOSE BLD GHB EST-SCNC: 5.8 MMOL/L
FERRITIN SERPL-MCNC: 21 NG/ML (ref 16–154)
GLUCOSE SERPL-MCNC: 85 MG/DL (ref 65–99)
HBA1C MFR BLD: 5.3 %
HBV SURFACE AB SERPL IA-ACNC: <5 MIU/ML
HBV SURFACE AG SERPL QL IA: NORMAL
HCT VFR BLD AUTO: 42.5 % (ref 35–45)
HCV AB SERPL QL IA: NORMAL
HDLC SERPL-MCNC: 58 MG/DL
HGB BLD-MCNC: 13.7 G/DL (ref 11.7–15.5)
HIV 1+2 AB+HIV1 P24 AG SERPL QL IA: NORMAL
IRON SATN MFR SERPL: 28 % (CALC) (ref 16–45)
IRON SERPL-MCNC: 89 MCG/DL (ref 40–190)
LDLC SERPL CALC-MCNC: 129 MG/DL (CALC)
MCH RBC QN AUTO: 30.5 PG (ref 27–33)
MCHC RBC AUTO-ENTMCNC: 32.2 G/DL (ref 32–36)
MCV RBC AUTO: 94.7 FL (ref 80–100)
N GONORRHOEA RRNA SPEC QL NAA+PROBE: NOT DETECTED
NONHDLC SERPL-MCNC: 146 MG/DL (CALC)
PLATELET # BLD AUTO: 199 THOUSAND/UL (ref 140–400)
PMV BLD REES-ECKER: 11.3 FL (ref 7.5–12.5)
POTASSIUM SERPL-SCNC: 4.2 MMOL/L (ref 3.5–5.3)
PROT SERPL-MCNC: 7.1 G/DL (ref 6.1–8.1)
QUEST GC CT AMPLIFIED (ALWAYS MESSAGE): NORMAL
RBC # BLD AUTO: 4.49 MILLION/UL (ref 3.8–5.1)
SODIUM SERPL-SCNC: 139 MMOL/L (ref 135–146)
T VAGINALIS RRNA SPEC QL NAA+PROBE: NOT DETECTED
TIBC SERPL-MCNC: 319 MCG/DL (CALC) (ref 250–450)
TRIGL SERPL-MCNC: 75 MG/DL
TSH SERPL-ACNC: 1.61 MIU/L
WBC # BLD AUTO: 5.7 THOUSAND/UL (ref 3.8–10.8)